# Patient Record
Sex: FEMALE | Race: ASIAN | NOT HISPANIC OR LATINO
[De-identification: names, ages, dates, MRNs, and addresses within clinical notes are randomized per-mention and may not be internally consistent; named-entity substitution may affect disease eponyms.]

---

## 2021-06-15 PROBLEM — Z00.00 ENCOUNTER FOR PREVENTIVE HEALTH EXAMINATION: Status: ACTIVE | Noted: 2021-06-15

## 2021-06-21 ENCOUNTER — APPOINTMENT (OUTPATIENT)
Dept: UROLOGY | Facility: CLINIC | Age: 56
End: 2021-06-21
Payer: MEDICAID

## 2021-06-21 VITALS
RESPIRATION RATE: 16 BRPM | DIASTOLIC BLOOD PRESSURE: 79 MMHG | HEART RATE: 68 BPM | TEMPERATURE: 97.8 F | WEIGHT: 132.3 LBS | SYSTOLIC BLOOD PRESSURE: 118 MMHG | OXYGEN SATURATION: 96 % | BODY MASS INDEX: 24.98 KG/M2 | HEIGHT: 61 IN

## 2021-06-21 DIAGNOSIS — Z87.39 PERSONAL HISTORY OF OTHER DISEASES OF THE MUSCULOSKELETAL SYSTEM AND CONNECTIVE TISSUE: ICD-10-CM

## 2021-06-21 PROCEDURE — 99204 OFFICE O/P NEW MOD 45 MIN: CPT

## 2021-06-21 RX ORDER — OMEPRAZOLE 20 MG/1
20 CAPSULE, DELAYED RELEASE ORAL
Qty: 30 | Refills: 0 | Status: ACTIVE | COMMUNITY
Start: 2021-06-16

## 2021-06-21 RX ORDER — HALOBETASOL PROPIONATE 0.5 MG/G
0.05 CREAM TOPICAL
Qty: 50 | Refills: 0 | Status: ACTIVE | COMMUNITY
Start: 2021-03-03

## 2021-06-21 RX ORDER — ONDANSETRON 4 MG/1
4 TABLET ORAL
Qty: 12 | Refills: 0 | Status: ACTIVE | COMMUNITY
Start: 2021-06-11

## 2021-06-21 RX ORDER — IBUPROFEN 600 MG/1
600 TABLET, FILM COATED ORAL
Qty: 30 | Refills: 0 | Status: ACTIVE | COMMUNITY
Start: 2021-06-11

## 2021-06-21 RX ORDER — GABAPENTIN 300 MG/1
300 CAPSULE ORAL
Qty: 30 | Refills: 0 | Status: ACTIVE | COMMUNITY
Start: 2021-06-04

## 2021-06-21 RX ORDER — DOCUSATE SODIUM 100 MG/1
100 CAPSULE, LIQUID FILLED ORAL
Qty: 60 | Refills: 0 | Status: ACTIVE | COMMUNITY
Start: 2021-06-16

## 2021-06-21 RX ORDER — OMEPRAZOLE 20 MG/1
20 CAPSULE, DELAYED RELEASE ORAL
Refills: 0 | Status: ACTIVE | COMMUNITY

## 2021-06-21 RX ORDER — IBUPROFEN 400 MG/1
400 TABLET, FILM COATED ORAL
Qty: 30 | Refills: 0 | Status: ACTIVE | COMMUNITY
Start: 2021-04-27

## 2021-06-21 RX ORDER — DOCUSATE SODIUM 100 MG/1
100 CAPSULE, LIQUID FILLED ORAL
Refills: 0 | Status: ACTIVE | COMMUNITY

## 2021-06-21 RX ORDER — HYDROXYCHLOROQUINE SULFATE 200 MG/1
200 TABLET, FILM COATED ORAL
Qty: 60 | Refills: 0 | Status: ACTIVE | COMMUNITY
Start: 2021-06-04

## 2021-06-21 RX ORDER — HYDROXYCHLOROQUINE SULFATE 200 MG/1
200 TABLET, FILM COATED ORAL
Refills: 0 | Status: ACTIVE | COMMUNITY

## 2021-06-21 RX ORDER — TAMSULOSIN HYDROCHLORIDE 0.4 MG/1
0.4 CAPSULE ORAL
Qty: 7 | Refills: 0 | Status: ACTIVE | COMMUNITY
Start: 2021-06-11

## 2021-06-21 RX ORDER — IBUPROFEN 600 MG/1
600 TABLET, FILM COATED ORAL
Qty: 60 | Refills: 0 | Status: ACTIVE | COMMUNITY
Start: 2021-06-21 | End: 1900-01-01

## 2021-06-21 RX ORDER — GABAPENTIN 300 MG/1
300 CAPSULE ORAL
Refills: 0 | Status: ACTIVE | COMMUNITY

## 2021-06-21 NOTE — ASSESSMENT
[FreeTextEntry1] : Patient is a 54 yo F who presents for L renal colic/L ureteral stone.  Also RLP stone noted.\par \par Counseled pt on stone dietary recommendations, such as increasing fluid intake and citrate intake (virgil, grapefruit), while decreasing salt, protein and oxalate.\par Pain control\par Strain urine\par Flomax\par Also counseled pt that if she develops fever/chills or intractable nausea/vomiting those are concerning symptoms and she should present immediately to ER \par \par For RLP stone, discussed with pt options for stone management including observation/conservative trial of passage, ESWL and URS/Laser litho/stent.\par Given HFU of L stone, R stone unlikely to be well seen for ESWL on fluoro.  D/w pt that URS intervention would be more successful for removing RLP stone.\par Pt does not want to undergo any surgical procedures.\par \par F/u for renal ULT in 1 mo to ensure L ureteral stone passage, otherwise will observe RLP stone.

## 2021-06-21 NOTE — HISTORY OF PRESENT ILLNESS
[FreeTextEntry1] : Patient is a 56 yo F who presents for kidney stones.\par \par She reports recent L flank pain radiating to LLQ.  Severe pain, lasted on/off for 1 wk.  Currently no pain.  Has not seen stone pass. \par \par She was seen 6/11/21 in [facility] ER for her pain - CT scan showed 5mm L UVK stone (536 HFU), 7mm RLP stone noted.  Labs unremarkable, UA neg, Cr wnl. [Fever] : no fever [Nausea] : no nausea

## 2021-08-02 ENCOUNTER — APPOINTMENT (OUTPATIENT)
Dept: UROLOGY | Facility: CLINIC | Age: 56
End: 2021-08-02
Payer: MEDICAID

## 2021-08-02 VITALS — TEMPERATURE: 98 F

## 2021-08-02 DIAGNOSIS — N20.0 CALCULUS OF KIDNEY: ICD-10-CM

## 2021-08-02 DIAGNOSIS — N20.1 CALCULUS OF URETER: ICD-10-CM

## 2021-08-02 PROCEDURE — 99213 OFFICE O/P EST LOW 20 MIN: CPT

## 2021-08-02 PROCEDURE — 76775 US EXAM ABDO BACK WALL LIM: CPT

## 2021-08-02 NOTE — ASSESSMENT
[FreeTextEntry1] : Patient is a 54 yo F who presents for kidney stones.\par \par -D/w pt that L sided stone appears within L renal pelvis and not ureter/UVJ.  Pt is not symptomatic currently.\par -D/w pt that given discordance, will obtain repeat CT scan -- d/w pt that if persistent stone w hydro would recommend intervention.  She is very hesitant to undergo intervention\par -F/u after CT scan

## 2021-08-02 NOTE — HISTORY OF PRESENT ILLNESS
[FreeTextEntry1] : Patient is a 56 yo F who presents for kidney stones.\par \par She reports recent L flank pain radiating to LLQ.  Severe pain, lasted on/off for 1 wk.  Currently no pain.  Has not seen stone pass. \par \par She was seen 6/11/21 in [facility] ER for her pain - CT scan report showed 5mm L UVJ stone (536 HFU), 7mm RLP stone noted.  Labs unremarkable, UA neg, Cr wnl.\par \par Interval hx:\par She has no pain currently, but has not seen stone pass. \par Renal ULT today showed a possible L pelvic stone (not UVJ) with mild associated hydro.  RLP stone noted.\par No fever/chills.

## 2021-08-03 RX ORDER — TAMSULOSIN HYDROCHLORIDE 0.4 MG/1
0.4 CAPSULE ORAL
Qty: 30 | Refills: 0 | Status: ACTIVE | COMMUNITY
Start: 2021-08-03 | End: 1900-01-01

## 2021-10-28 ENCOUNTER — NON-APPOINTMENT (OUTPATIENT)
Age: 56
End: 2021-10-28